# Patient Record
Sex: FEMALE | Race: WHITE | URBAN - METROPOLITAN AREA
[De-identification: names, ages, dates, MRNs, and addresses within clinical notes are randomized per-mention and may not be internally consistent; named-entity substitution may affect disease eponyms.]

---

## 2020-01-27 ENCOUNTER — Encounter (OUTPATIENT)
Dept: URBAN - METROPOLITAN AREA CLINIC 71 | Facility: CLINIC | Age: 65
End: 2020-01-27
Payer: COMMERCIAL

## 2020-01-27 PROCEDURE — 92002 INTRM OPH EXAM NEW PATIENT: CPT | Performed by: OPTOMETRIST

## 2020-10-28 ENCOUNTER — OFFICE VISIT (OUTPATIENT)
Dept: URBAN - METROPOLITAN AREA CLINIC 71 | Facility: CLINIC | Age: 65
End: 2020-10-28
Payer: COMMERCIAL

## 2020-10-28 DIAGNOSIS — H25.13 BILATERAL NUCLEAR SCLEROSIS CATARACT: ICD-10-CM

## 2020-10-28 PROCEDURE — 92134 CPTRZ OPH DX IMG PST SGM RTA: CPT | Performed by: OPHTHALMOLOGY

## 2020-10-28 PROCEDURE — 92014 COMPRE OPH EXAM EST PT 1/>: CPT | Performed by: OPHTHALMOLOGY

## 2020-10-28 ASSESSMENT — INTRAOCULAR PRESSURE
OS: 13
OD: 9

## 2020-10-28 NOTE — ASSESSMENT/PLAN
Impression: OCT MAC - OD: Good-vmt; OS: Good-vmt Plan: vmt ou 
testing ordered by Dr. Blanca Alexandra

## 2020-10-28 NOTE — IMPRESSION/PLAN
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 Plan: The clinical exam and OCT are consistent with vitreomacular traction syndrome. About 50% of these cases will spontaneously resolve as the vitreous separates from the retina. Occasionally, the traction will progress to a full thickness macular hole. I recommend observation for spontaneous resolution at this time.

## 2020-12-18 ENCOUNTER — OFFICE VISIT (OUTPATIENT)
Dept: URBAN - METROPOLITAN AREA CLINIC 68 | Facility: CLINIC | Age: 65
End: 2020-12-18
Payer: COMMERCIAL

## 2020-12-18 DIAGNOSIS — H43.823 VITREOMACULAR ADHESION, BILATERAL: Primary | ICD-10-CM

## 2020-12-18 PROCEDURE — 99204 OFFICE O/P NEW MOD 45 MIN: CPT | Performed by: OPHTHALMOLOGY

## 2020-12-18 PROCEDURE — 92134 CPTRZ OPH DX IMG PST SGM RTA: CPT | Performed by: OPHTHALMOLOGY

## 2020-12-18 ASSESSMENT — INTRAOCULAR PRESSURE
OD: 18
OS: 18

## 2020-12-18 NOTE — IMPRESSION/PLAN
Impression: Vitreomacular adhesion, bilateral: [id number]. OCT OU = VMT with only mild distortion OU /289 Plan: The patient has vitreomacular traction that is causing cystoid macular edema OU. These findings were confirmed with OCT and FA today. About 50% of these cases will spontaneously resolve as the vitreous separates from the retina. Occasionally the traction will progress to a full thickness macular hole. Discussed treatment options with patient, including careful monitoring, intravitreal Jetrea injection, and vitrectomy. After discussing the R/B/A/C/P, the patient elects to proceed with obs 3m OCT OU

## 2020-12-18 NOTE — IMPRESSION/PLAN
Impression: Age-related nuclear cataract, bilateral: H25.13.  Plan: NVS.  Followed by Dr. Tobias Section

## 2022-12-31 NOTE — IMPRESSION/PLAN
Impression: Bilateral nuclear sclerosis cataract: H25.13. Plan: Cataracts do not require treatment unless they interfere with vision and impact one's activities of daily living, in which case cataract extraction with lens implant insertion should be performed. Cataracts occur in everyone as they age. Contact office if you experience progressive loss of vision, increasing glare, and problems with daily activities such as driving, reading, watching tv, seeing street signs, or following a golf ball. Statement Selected